# Patient Record
Sex: MALE | Race: WHITE | URBAN - METROPOLITAN AREA
[De-identification: names, ages, dates, MRNs, and addresses within clinical notes are randomized per-mention and may not be internally consistent; named-entity substitution may affect disease eponyms.]

---

## 2017-08-17 ENCOUNTER — OFFICE VISIT (OUTPATIENT)
Dept: FAMILY MEDICINE | Facility: CLINIC | Age: 32
End: 2017-08-17
Payer: COMMERCIAL

## 2017-08-17 VITALS
WEIGHT: 232 LBS | OXYGEN SATURATION: 98 % | BODY MASS INDEX: 32.48 KG/M2 | DIASTOLIC BLOOD PRESSURE: 64 MMHG | HEART RATE: 67 BPM | TEMPERATURE: 97.9 F | SYSTOLIC BLOOD PRESSURE: 108 MMHG | HEIGHT: 71 IN

## 2017-08-17 DIAGNOSIS — G47.00 INSOMNIA, UNSPECIFIED TYPE: Primary | ICD-10-CM

## 2017-08-17 PROCEDURE — 99213 OFFICE O/P EST LOW 20 MIN: CPT | Performed by: FAMILY MEDICINE

## 2017-08-17 NOTE — PROGRESS NOTES
"  SUBJECTIVE:   Karel Sanchez is a 31 year old male who presents to clinic today for the following health issues:    Insomnia  Onset: x 2 yrs    Description:   Time to fall asleep (sleep latency): 1 hour  Middle of night awakening:  no   Early morning awakening:  YES - at times    Progression of Symptoms:  same    Accompanying Signs & Symptoms:  Daytime sleepiness/napping: YES- Depending on how much sleep he got the night before  Excessive snoring/apnea: no   Restless legs: no  Frequent urination: no   Chronic pain:  no    History:  Prior Insomnia: YES  - Old work schedule    Precipitating factors:   New stressful situation: no   Caffeine intake: YES- 1 energy drink per day  OTC decongestants: no   Any new medications: no     Alleviating factors:  Self medicating (alcohol, etc.):  YES- Melatonin 2mg at bedtime prn  Patient denies anxiety   He denies depression   He does not think he has sleep apnea     He has done a lot of shift work and this tends to mess up his sleep schedule   Once a sleep he does generally sleep through the night     O: /64  Pulse 67  Temp 97.9  F (36.6  C) (Oral)  Ht 5' 10.75\" (1.797 m)  Wt 232 lb (105.2 kg)  SpO2 98%  BMI 32.59 kg/m2    In NAd   No signs of depression   He seem,s relaxed in the room       ICD-10-CM    1. Insomnia, unspecified type G47.00 SLEEP EVALUATION & MANAGEMENT REFERRAL - ADULT         Problem list and histories reviewed & adjusted, as indicated.  Additional history: as documented    Suggested he actually consider decreasing the dose of his melatonin and taking it with supper     Reviewed and updated as needed this visit by clinical staff       Reviewed and updated as needed this visit by Provider             "

## 2017-08-17 NOTE — NURSING NOTE
"Chief Complaint   Patient presents with     Sleep Problem     Can't fall alsleep       Initial /64  Pulse 67  Temp 97.9  F (36.6  C) (Oral)  Ht 5' 10.75\" (1.797 m)  Wt 232 lb (105.2 kg)  SpO2 98%  BMI 32.59 kg/m2 Estimated body mass index is 32.59 kg/(m^2) as calculated from the following:    Height as of this encounter: 5' 10.75\" (1.797 m).    Weight as of this encounter: 232 lb (105.2 kg).  Medication Reconciliation: complete  Joyce Guzman MA    "

## 2017-08-17 NOTE — MR AVS SNAPSHOT
After Visit Summary   8/17/2017    Karel Sanchez    MRN: [id number]           Patient Information     Date Of Birth          1985        Visit Information        Provider Department      8/17/2017 5:20 PM Jorge L Rivas MD [facility]        Today's Diagnoses     Insomnia, unspecified type    -  1       Follow-ups after your visit        Additional Services     SLEEP EVALUATION & MANAGEMENT REFERRAL - ADULT       Please be aware that coverage of these services is subject to the terms and limitations of your health insurance plan.  Call member services at your health plan with any benefit or coverage questions.      Please bring the following to your appointment:    >>   List of current medications   >>   This referral request   >>   Any documents/labs given to you for this referral    [facility] [phone] (Age 15 and up)                  Future tests that were ordered for you today     Open Future Orders        Priority Expected Expires Ordered    SLEEP EVALUATION & MANAGEMENT REFERRAL - ADULT Routine  8/17/2018 8/17/2017            Who to contact     If you have questions or need follow up information about today's clinic visit or your schedule please contact [facility] directly at [phone].  Normal or non-critical lab and imaging results will be communicated to you by MyChart, letter or phone within 4 business days after the clinic has received the results. If you do not hear from us within 7 days, please contact the clinic through MyChart or phone. If you have a critical or abnormal lab result, we will notify you by phone as soon as possible.  Submit refill requests through [employer] or call your pharmacy and they will forward the refill request to us. Please allow 3 business days for your refill to be completed.          Additional Information About Your Visit        MyChart Information     [employer] lets you  "send messages to your doctor, view your test results, renew your prescriptions, schedule appointments and more. To sign up, go to [email or web address] . Click on \"Log in\" on the left side of the screen, which will take you to the Welcome page. Then click on \"Sign up Now\" on the right side of the page.     You will be asked to enter the access code listed below, as well as some personal information. Please follow the directions to create your username and password.     Your access code is: [email or web address]  Expires: 11/15/2017  6:08 PM     Your access code will  in 90 days. If you need help or a new code, please call your [facility] clinic or [phone].        Care EveryWhere ID     This is your Care EveryWhere ID. This could be used by other organizations to access your [facility] medical records  [id number]        Your Vitals Were     Pulse Temperature Height Pulse Oximetry BMI (Body Mass Index)       67 97.9  F (36.6  C) (Oral) 5' 10.75\" (1.797 m) 98% 32.59 kg/m2        Blood Pressure from Last 3 Encounters:   17 108/64    Weight from Last 3 Encounters:   17 232 lb (105.2 kg)               Primary Care Provider    None Specified       No primary provider on file.        Equal Access to Services     [facility] : Hadii micaela bridges hadasho Soomaali, waaxda luqadaha, qaybta kaalmada adeegyada, wendy schumacher haykaylynn tena . So [facility] [phone].    ATENCIÓN: Si habla español, tiene a aceves disposición servicios gratuitos de asistencia lingüística. Llame al [phone].    We comply with applicable federal civil rights laws and [place] laws. We do not discriminate on the basis of race, color, national origin, age, disability sex, sexual orientation or gender identity.            Thank you!     Thank you for choosing [facility]  for your care. Our goal is always to provide you with excellent care. Hearing back from our patients is one way we can continue to improve our " services. Please take a few minutes to complete the written survey that you may receive in the mail after your visit with us. Thank you!             Your Updated Medication List - Protect others around you: Learn how to safely use, store and throw away your medicines at www.disposemymeds.org.          This list is accurate as of: 8/17/17  6:08 PM.  Always use your most recent med list.                   Brand Name Dispense Instructions for use Diagnosis    MELATONIN PO      Take 2 mg by mouth nightly as needed